# Patient Record
Sex: FEMALE | Race: BLACK OR AFRICAN AMERICAN | Employment: UNEMPLOYED | URBAN - METROPOLITAN AREA
[De-identification: names, ages, dates, MRNs, and addresses within clinical notes are randomized per-mention and may not be internally consistent; named-entity substitution may affect disease eponyms.]

---

## 2017-01-16 ENCOUNTER — TELEPHONE (OUTPATIENT)
Dept: ENDOCRINOLOGY | Age: 62
End: 2017-01-16

## 2017-01-16 DIAGNOSIS — E05.00 GRAVES' DISEASE: Primary | ICD-10-CM

## 2017-01-17 LAB
T3 FREE: 3.6 PG/ML (ref 2.1–4.4)
T4 FREE: 1.6 NG/DL (ref 0.6–1.7)
TSH ULTRASENSITIVE: 0.01 MIU/L (ref 0.4–4.2)

## 2017-01-18 ENCOUNTER — OFFICE VISIT (OUTPATIENT)
Dept: ENDOCRINOLOGY | Age: 62
End: 2017-01-18

## 2017-01-18 VITALS
HEART RATE: 82 BPM | SYSTOLIC BLOOD PRESSURE: 139 MMHG | WEIGHT: 209 LBS | DIASTOLIC BLOOD PRESSURE: 88 MMHG | HEIGHT: 68 IN | OXYGEN SATURATION: 98 % | BODY MASS INDEX: 31.67 KG/M2

## 2017-01-18 DIAGNOSIS — E05.90 HYPERTHYROIDISM: ICD-10-CM

## 2017-01-18 DIAGNOSIS — E04.1 THYROID NODULE: ICD-10-CM

## 2017-01-18 DIAGNOSIS — E05.00 GRAVES' DISEASE: Primary | ICD-10-CM

## 2017-01-18 PROCEDURE — 99213 OFFICE O/P EST LOW 20 MIN: CPT | Performed by: INTERNAL MEDICINE

## 2017-03-22 ENCOUNTER — OFFICE VISIT (OUTPATIENT)
Dept: ENDOCRINOLOGY | Age: 62
End: 2017-03-22

## 2017-03-22 VITALS
RESPIRATION RATE: 16 BRPM | BODY MASS INDEX: 31.73 KG/M2 | DIASTOLIC BLOOD PRESSURE: 83 MMHG | HEART RATE: 92 BPM | WEIGHT: 209.34 LBS | SYSTOLIC BLOOD PRESSURE: 130 MMHG | HEIGHT: 68 IN | OXYGEN SATURATION: 95 %

## 2017-03-22 DIAGNOSIS — E05.90 HYPERTHYROIDISM: ICD-10-CM

## 2017-03-22 DIAGNOSIS — E04.1 THYROID NODULE: ICD-10-CM

## 2017-03-22 DIAGNOSIS — E05.00 GRAVES' DISEASE: Primary | ICD-10-CM

## 2017-03-22 PROCEDURE — 99213 OFFICE O/P EST LOW 20 MIN: CPT | Performed by: INTERNAL MEDICINE

## 2017-10-04 ENCOUNTER — OFFICE VISIT (OUTPATIENT)
Dept: ENDOCRINOLOGY | Age: 62
End: 2017-10-04

## 2017-10-04 VITALS
BODY MASS INDEX: 31.43 KG/M2 | HEART RATE: 91 BPM | DIASTOLIC BLOOD PRESSURE: 95 MMHG | OXYGEN SATURATION: 96 % | SYSTOLIC BLOOD PRESSURE: 139 MMHG | RESPIRATION RATE: 16 BRPM | WEIGHT: 207.4 LBS | HEIGHT: 68 IN

## 2017-10-04 DIAGNOSIS — E05.90 HYPERTHYROIDISM: ICD-10-CM

## 2017-10-04 DIAGNOSIS — E05.00 GRAVES' DISEASE: Primary | ICD-10-CM

## 2017-10-04 PROCEDURE — 99214 OFFICE O/P EST MOD 30 MIN: CPT | Performed by: INTERNAL MEDICINE

## 2017-10-04 NOTE — MR AVS SNAPSHOT
cancers. BMI is not perfect. It may overestimate body fat in athletes and people who are more muscular. Even a small weight loss (between 5 and 10 percent of your current weight) by decreasing your calorie intake and becoming more physically active will help lower your risk of developing or worsening diseases associated with obesity. Learn more at: [email or web address]             Medications and Orders      Your Current Medications Are              amLODIPine (NORVASC) 10 MG tablet Take 10 mg by mouth daily. potassium chloride SA (KLOR-CON M10) 10 MEQ tablet Take 10 mEq by mouth 2 times daily. hydrochlorothiazide (HYDRODIURIL) 25 MG tablet Take 25 mg by mouth daily       Allergies           No Known Allergies         Additional Information        Basic Information     Date Of Birth Sex Race Ethnicity Preferred Language    1955 Female Black Non-/Non  English      Problem List as of 10/4/2017                 Graves' disease    Hyperthyroidism    Thyroid nodule      Preventive Care        Date Due    Hepatitis C screening is recommended for all adults regardless of risk factors born between [facility] at least once (lifetime) who have never been tested. 1955    HIV screening is recommended for all people regardless of risk factors  aged 15-65 years at least once (lifetime) who have never been HIV tested. 6/13/1971    Tetanus Combination Vaccine (1 - Tdap) 6/13/1975    Pap Smear 6/13/1977    Cholesterol Screening 6/13/1996    Diabetes Screening 6/13/1996    Mammograms are recommended every 2 years for low/average risk patients aged 48 - 69, and every year for high risk patients per updated national guidelines. However these guidelines can be individualized by your provider.  6/13/2006    Colonoscopy 6/13/2006    Zoster Vaccine 6/13/2016    Yearly Flu Vaccine (1) 9/1/2017            [facility] allows you to send messages to your doctor, view your test results, renew your prescriptions, schedule appointments, view visit notes, and more. How Do I Sign Up? 1. In your Internet browser, go to [email or web address]  2. Click on the Sign Up Now link in the Sign In box. You will see the New Member Sign Up page. 3. Enter your [employer] Access Code exactly as it appears below. You will not need to use this code after youve completed the sign-up process. If you do not sign up before the expiration date, you must request a new code. [employer] Access Code: [email or web address]  Expires: 12/3/2017 12:02 PM    4. Enter your Social Security Number ([id number]) and Date of Birth (mm/dd/yyyy) as indicated and click Submit. You will be taken to the next sign-up page. 5. Create a [employer] ID. This will be your [employer] login ID and cannot be changed, so think of one that is secure and easy to remember. 6. Create a [employer] password. You can change your password at any time. 7. Enter your Password Reset Question and Answer. This can be used at a later time if you forget your password. 8. Enter your e-mail address. You will receive e-mail notification when new information is available in [address]. 9. Click Sign Up. You can now view your medical record. Additional Information  If you have questions, please contact the physician practice where you receive care. Remember, [employer] is NOT to be used for urgent needs. For medical emergencies, dial 911. For questions regarding your [employer] account call [phone]. If you have a clinical question, please call your doctor's office.

## 2017-10-04 NOTE — PROGRESS NOTES
Subjective:      62 y/o AAF who is here for thyroid evaluation. Interim: she was having anxiety, so started tapazole. Was on it for 4 weeks prior to lab work    She was having heat intolerance, tremors , left eye proptosis , she was started on tapazol 5mg TID. Initial complaints: 20lb weight loss, tremors, eye changes  History of obstructive symptoms:  difficulty swallowing No, changes in voice/hoarseness  yes  History of radiation to patient's neck:   No  Recent iodine exposure:  No  Family history includes no thyroid abnormalities. Family history of thyroid cancer:  No    Current smoking of cigarettes or cigars: No    6/14 FSH 23.7  A1c 6.1  TSH 0.02 T4  21.6 H (<11)  TPo, Tg negative  On tapazole 5mg TID  9/14 TSH <0.01 FT4 4.7  FT3 9.7  Increase dose to 10mg TID    8/15  TSH 0.02 FT4 1.3 was taking only twice a day    Dose increased by PCP to 20mg TID    6/16 TSH 0.09  Tapazole 30 mg daily    8/14 Thyroid Ultrasound  Thyroid size : Right lobe 5.3 x 2.7 x 2.3 cm   Left lobe 4.4 x 1.9 x 1.9 cm  Echotexture : normal  Nodules : Bilateral thyroid nodules are present. The dominant  nodule measures 2.2 x 2.2 x 2.1 cm and is complex cystic/solid in  the right inferior gland. Two largest left-sided nodules :  predominantly cystic with mild internal complexity measuring 1.5 x  1.1 x 1.4 cm and solid measuring 9 right x 7 mm, both in the  upper/interpolar gland. IMPRESSION :  Findings most compatible with multinodular goiter. Ultrasound guided aspiration biopsy of the dominant 2.3 cm  right-sided nodule is recommended. 9/14 Thyroid scan  Impression:    1. Abnormally high thyroid uptake at 24 hours measured at 82%. 2. Cold nodule in the mid to inferior left thyroid lobe   corresponding to a cystic nodule identified on recent ultrasound.      10/16 FNA of right nodule benign  12/16 [facility] 12.5mci    1/17  TSH 0.01 Free level normal on tapazole 10mg  3/17 TSH 0.02 FT3 4.8 FT4 1.6 off medication  8/17  TSH 15 FT4 0.4    Review of Systems  Constitutional: Negative for weight loss and malaise/fatigue. - weight gain  Eyes: Negative for blurred vision. Left eye proptosis  Respiratory: Negative for cough and sputum production. Cardiovascular: Negative for chest pain, palpitations and leg swelling. Gastrointestinal: Negative for nausea, vomiting and abdominal pain. .   Musculoskeletal: Negative for back pain. No joint pain  Skin: Negative for itching and rash. Endo/Heme/Allergies: No tremors, heat intolerance  Psychiatric/Behavioral: Negative for depression and substance abuse. SEE SCANNED     Objective: There were no vitals taken for this visit. Wt Readings from Last 3 Encounters:   03/22/17 209 lb 5.4 oz (95 kg)   01/18/17 209 lb (94.8 kg)   10/14/16 209 lb (94.8 kg)       Constitutional: Well-developed, appears stated age, cooperative, in no acute distress  H/E/N/M/T:atraumatic, normocephalic, external ears, nose, lips normal without lesions  Eyes: Lids, lashes, conjunctivae and sclerae normal, +left eye proptosis  Neck: supple, symmetrical, trachea midline   Thyroid: gland size abnormal, mildly enlarged  Skin: No obvious rashes or lesions present. Psychiatric: Judgement and Insight:  judgement and insight appear normal      Lab Review  Lab Results   Component Value Date    TSH 15.13 08/29/2017     No results found for: FREET4      Assessment: 1. Hyperthyroidism: Secondary to  graves. Was On tapazole,not following regularly, prescribed definite treatment with [facility], level improved but still hyperthyroid, she restarted tapazole 30mg on her own, now she is hypothyroid. She may want to do anti thyroid medication as an alternative  2. Thyroid Nodule: Limited Ultrasound in clinic showed enlarged gland, heterogenous and vascular. 2.9x2.3cm ill defined nodule on the right side and a 1.4cm cyst on the left side.  Given these findings, got Ultrasound 8/14, reviewed images , showed a 2.2cm right nodule and 1.4cm left

## 2017-11-10 LAB
T3 FREE: 3.1 PG/ML (ref 2.1–4.4)
T4 FREE: 1 NG/DL (ref 0.6–1.7)
TSH ULTRASENSITIVE: 3.88 MIU/L (ref 0.45–5.33)

## 2017-11-13 ENCOUNTER — OFFICE VISIT (OUTPATIENT)
Dept: ENDOCRINOLOGY | Age: 62
End: 2017-11-13

## 2017-11-13 VITALS
HEART RATE: 78 BPM | DIASTOLIC BLOOD PRESSURE: 78 MMHG | SYSTOLIC BLOOD PRESSURE: 130 MMHG | WEIGHT: 198.4 LBS | HEIGHT: 67 IN | BODY MASS INDEX: 31.14 KG/M2 | RESPIRATION RATE: 16 BRPM

## 2017-11-13 DIAGNOSIS — E04.1 THYROID NODULE: ICD-10-CM

## 2017-11-13 DIAGNOSIS — E05.00 GRAVES' DISEASE: Primary | ICD-10-CM

## 2017-11-13 PROCEDURE — G8427 DOCREV CUR MEDS BY ELIG CLIN: HCPCS | Performed by: INTERNAL MEDICINE

## 2017-11-13 PROCEDURE — 99213 OFFICE O/P EST LOW 20 MIN: CPT | Performed by: INTERNAL MEDICINE

## 2017-11-13 PROCEDURE — G8417 CALC BMI ABV UP PARAM F/U: HCPCS | Performed by: INTERNAL MEDICINE

## 2017-11-13 PROCEDURE — 1036F TOBACCO NON-USER: CPT | Performed by: INTERNAL MEDICINE

## 2017-11-13 PROCEDURE — 3017F COLORECTAL CA SCREEN DOC REV: CPT | Performed by: INTERNAL MEDICINE

## 2017-11-13 PROCEDURE — G8484 FLU IMMUNIZE NO ADMIN: HCPCS | Performed by: INTERNAL MEDICINE

## 2017-11-13 PROCEDURE — 3014F SCREEN MAMMO DOC REV: CPT | Performed by: INTERNAL MEDICINE

## 2017-11-13 NOTE — PROGRESS NOTES
, showed a 2.2cm right nodule and 1.4cm left cyst and 9mm left nodule. FNA of right sided nodule benign  3. Prediabetes: Advise diet restriction and exercise     Plan:     1.  Repeat TFT in 6 months

## 2018-05-14 ENCOUNTER — OFFICE VISIT (OUTPATIENT)
Dept: ENDOCRINOLOGY | Age: 63
End: 2018-05-14

## 2018-05-14 VITALS
RESPIRATION RATE: 16 BRPM | WEIGHT: 213.2 LBS | SYSTOLIC BLOOD PRESSURE: 138 MMHG | BODY MASS INDEX: 32.31 KG/M2 | HEART RATE: 86 BPM | HEIGHT: 68 IN | DIASTOLIC BLOOD PRESSURE: 88 MMHG

## 2018-05-14 DIAGNOSIS — E05.00 GRAVES' DISEASE: Primary | ICD-10-CM

## 2018-05-14 DIAGNOSIS — E03.8 SUBCLINICAL HYPOTHYROIDISM: ICD-10-CM

## 2018-05-14 DIAGNOSIS — E04.1 THYROID NODULE: ICD-10-CM

## 2018-05-14 PROCEDURE — 1036F TOBACCO NON-USER: CPT | Performed by: INTERNAL MEDICINE

## 2018-05-14 PROCEDURE — G8417 CALC BMI ABV UP PARAM F/U: HCPCS | Performed by: INTERNAL MEDICINE

## 2018-05-14 PROCEDURE — 3017F COLORECTAL CA SCREEN DOC REV: CPT | Performed by: INTERNAL MEDICINE

## 2018-05-14 PROCEDURE — G8427 DOCREV CUR MEDS BY ELIG CLIN: HCPCS | Performed by: INTERNAL MEDICINE

## 2018-05-14 PROCEDURE — 99214 OFFICE O/P EST MOD 30 MIN: CPT | Performed by: INTERNAL MEDICINE

## 2018-05-14 RX ORDER — CALCIUM CARBONATE 300MG(750)
TABLET,CHEWABLE ORAL
COMMUNITY

## 2018-05-14 RX ORDER — LEVOTHYROXINE SODIUM 0.05 MG/1
50 TABLET ORAL DAILY
Qty: 30 TABLET | Refills: 3 | Status: SHIPPED | OUTPATIENT
Start: 2018-05-14 | End: 2018-09-07 | Stop reason: SDUPTHER

## 2018-09-07 RX ORDER — LEVOTHYROXINE SODIUM 0.05 MG/1
TABLET ORAL
Qty: 30 TABLET | Refills: 3 | Status: SHIPPED | OUTPATIENT
Start: 2018-09-07 | End: 2019-01-16 | Stop reason: SDUPTHER

## 2018-10-15 ENCOUNTER — OFFICE VISIT (OUTPATIENT)
Dept: ENDOCRINOLOGY | Age: 63
End: 2018-10-15
Payer: COMMERCIAL

## 2018-10-15 VITALS
HEIGHT: 68 IN | OXYGEN SATURATION: 95 % | RESPIRATION RATE: 16 BRPM | SYSTOLIC BLOOD PRESSURE: 118 MMHG | BODY MASS INDEX: 31.67 KG/M2 | DIASTOLIC BLOOD PRESSURE: 78 MMHG | WEIGHT: 209 LBS | HEART RATE: 104 BPM

## 2018-10-15 DIAGNOSIS — E04.1 THYROID NODULE: ICD-10-CM

## 2018-10-15 DIAGNOSIS — E05.00 GRAVES' DISEASE: Primary | ICD-10-CM

## 2018-10-15 DIAGNOSIS — E05.90 HYPERTHYROIDISM: ICD-10-CM

## 2018-10-15 PROCEDURE — 1036F TOBACCO NON-USER: CPT | Performed by: INTERNAL MEDICINE

## 2018-10-15 PROCEDURE — G8484 FLU IMMUNIZE NO ADMIN: HCPCS | Performed by: INTERNAL MEDICINE

## 2018-10-15 PROCEDURE — 3017F COLORECTAL CA SCREEN DOC REV: CPT | Performed by: INTERNAL MEDICINE

## 2018-10-15 PROCEDURE — G8427 DOCREV CUR MEDS BY ELIG CLIN: HCPCS | Performed by: INTERNAL MEDICINE

## 2018-10-15 PROCEDURE — 99213 OFFICE O/P EST LOW 20 MIN: CPT | Performed by: INTERNAL MEDICINE

## 2018-10-15 PROCEDURE — G8417 CALC BMI ABV UP PARAM F/U: HCPCS | Performed by: INTERNAL MEDICINE

## 2019-01-16 RX ORDER — LEVOTHYROXINE SODIUM 0.05 MG/1
TABLET ORAL
Qty: 30 TABLET | Refills: 3 | Status: SHIPPED | OUTPATIENT
Start: 2019-01-16 | End: 2019-06-21 | Stop reason: SDUPTHER

## 2019-06-21 RX ORDER — LEVOTHYROXINE SODIUM 0.05 MG/1
TABLET ORAL
Qty: 30 TABLET | Refills: 3 | Status: SHIPPED | OUTPATIENT
Start: 2019-06-21